# Patient Record
Sex: MALE | Race: WHITE | NOT HISPANIC OR LATINO | URBAN - METROPOLITAN AREA
[De-identification: names, ages, dates, MRNs, and addresses within clinical notes are randomized per-mention and may not be internally consistent; named-entity substitution may affect disease eponyms.]

---

## 2018-03-01 ENCOUNTER — EMERGENCY (EMERGENCY)
Facility: HOSPITAL | Age: 68
LOS: 1 days | Discharge: HOME | End: 2018-03-01
Attending: INTERNAL MEDICINE | Admitting: INTERNAL MEDICINE
Payer: MEDICARE

## 2018-03-01 ENCOUNTER — EMERGENCY (EMERGENCY)
Facility: HOSPITAL | Age: 68
LOS: 1 days | Discharge: HOME | End: 2018-03-01
Attending: EMERGENCY MEDICINE | Admitting: EMERGENCY MEDICINE
Payer: MEDICARE

## 2018-03-01 VITALS
DIASTOLIC BLOOD PRESSURE: 80 MMHG | TEMPERATURE: 98 F | RESPIRATION RATE: 16 BRPM | HEART RATE: 70 BPM | OXYGEN SATURATION: 100 % | SYSTOLIC BLOOD PRESSURE: 140 MMHG

## 2018-03-01 VITALS
WEIGHT: 149.91 LBS | DIASTOLIC BLOOD PRESSURE: 87 MMHG | TEMPERATURE: 99 F | HEART RATE: 72 BPM | RESPIRATION RATE: 16 BRPM | SYSTOLIC BLOOD PRESSURE: 170 MMHG | HEIGHT: 69 IN | OXYGEN SATURATION: 100 %

## 2018-03-01 VITALS
DIASTOLIC BLOOD PRESSURE: 96 MMHG | TEMPERATURE: 98 F | HEIGHT: 65 IN | WEIGHT: 149.91 LBS | HEART RATE: 86 BPM | OXYGEN SATURATION: 100 % | SYSTOLIC BLOOD PRESSURE: 168 MMHG | RESPIRATION RATE: 25 BRPM

## 2018-03-01 DIAGNOSIS — Z98.89 OTHER SPECIFIED POSTPROCEDURAL STATES: Chronic | ICD-10-CM

## 2018-03-01 DIAGNOSIS — Z90.49 ACQUIRED ABSENCE OF OTHER SPECIFIED PARTS OF DIGESTIVE TRACT: Chronic | ICD-10-CM

## 2018-03-01 LAB
APPEARANCE UR: CLEAR — SIGNIFICANT CHANGE UP
BACTERIA # UR AUTO: ABNORMAL
BILIRUB UR-MCNC: NEGATIVE — SIGNIFICANT CHANGE UP
COLOR SPEC: SIGNIFICANT CHANGE UP
DIFF PNL FLD: NEGATIVE — SIGNIFICANT CHANGE UP
EPI CELLS # UR: NEGATIVE — SIGNIFICANT CHANGE UP
GLUCOSE UR QL: NEGATIVE — SIGNIFICANT CHANGE UP
KETONES UR-MCNC: NEGATIVE — SIGNIFICANT CHANGE UP
LEUKOCYTE ESTERASE UR-ACNC: NEGATIVE — SIGNIFICANT CHANGE UP
NITRITE UR-MCNC: NEGATIVE — SIGNIFICANT CHANGE UP
PH UR: 7 — SIGNIFICANT CHANGE UP (ref 5–8)
PROT UR-MCNC: NEGATIVE — SIGNIFICANT CHANGE UP
RBC CASTS # UR COMP ASSIST: SIGNIFICANT CHANGE UP /HPF (ref 0–4)
SP GR SPEC: 1 — LOW (ref 1.01–1.02)
UROBILINOGEN FLD QL: NEGATIVE — SIGNIFICANT CHANGE UP
WBC UR QL: NEGATIVE — SIGNIFICANT CHANGE UP

## 2018-03-01 PROCEDURE — 99283 EMERGENCY DEPT VISIT LOW MDM: CPT | Mod: 25

## 2018-03-01 PROCEDURE — 87086 URINE CULTURE/COLONY COUNT: CPT

## 2018-03-01 PROCEDURE — 99284 EMERGENCY DEPT VISIT MOD MDM: CPT | Mod: 25

## 2018-03-01 PROCEDURE — 99283 EMERGENCY DEPT VISIT LOW MDM: CPT

## 2018-03-01 PROCEDURE — 81001 URINALYSIS AUTO W/SCOPE: CPT

## 2018-03-01 PROCEDURE — 51702 INSERT TEMP BLADDER CATH: CPT

## 2018-03-01 RX ORDER — PHENAZOPYRIDINE HCL 100 MG
200 TABLET ORAL ONCE
Qty: 0 | Refills: 0 | Status: COMPLETED | OUTPATIENT
Start: 2018-03-01 | End: 2018-03-01

## 2018-03-01 RX ORDER — PHENAZOPYRIDINE HCL 100 MG
1 TABLET ORAL
Qty: 15 | Refills: 0 | OUTPATIENT
Start: 2018-03-01 | End: 2018-03-05

## 2018-03-01 RX ADMIN — Medication 200 MILLIGRAM(S): at 22:40

## 2018-03-01 NOTE — ED PROVIDER NOTE - FAMILY HISTORY
Father  Still living? No  FH: lung cancer, Age at diagnosis: Age Unknown     Mother  Still living? No  FH: lung cancer, Age at diagnosis: Age Unknown

## 2018-03-01 NOTE — ED PROVIDER NOTE - SIGNIFICANT NEGATIVE FINDINGS
Instructed to call immediately if any new distortion, blurring, decreased vision or eye pain. no headache, no chest pain, no SOB, no palpitations, no abdominal pain,  no n/v.

## 2018-03-01 NOTE — ED PROVIDER NOTE - PROGRESS NOTE DETAILS
CONSULTED DR KWON, WHO ADVISED TO DC PT WITH CATHETER AND ADVISED PT TO FOLLOW UP MONDAY FOR CATH REMOVAL AND WEDNESDAY FOR APT WITH HIM. All questions answered and concerns addressed. pt verbalized understanding and agreement with plan and dx. pt advised to follow up with PMD. pt advised to return to ed for worsenng symptoms including fever, cp, sob. will dc.

## 2018-03-01 NOTE — ED PROVIDER NOTE - OBJECTIVE STATEMENT
pt is a 68yo male with no significant pmhx c/o urinary retention x today. pt reports he was dx with prostatitis 2 days ago by dr lynch placed on bactrim. pt reports this morning he voided without issue and then was unable to void. pt denies fever, cp, sob, abd pain, n/v/v.

## 2018-03-01 NOTE — ED ADULT NURSE REASSESSMENT NOTE - NS ED NURSE REASSESS COMMENT FT1
pt reavaluated no c/o pain voiced and bladder scan done with 11ml noted pt urinating with no pain slight tea colored noted no bleeding pt evaluated and medicated with prydium 200 mg po an d/c home to follow up

## 2018-03-01 NOTE — ED PROVIDER NOTE - OBJECTIVE STATEMENT
66 y/o w/m h/o prostatitis with urine retention this morning, tx with Huber- 16 and is on AB, PMD [place].  This evening he experienced bladder spasms and urine that flowed outside the lumen of the cathter. He is now asymptomatic

## 2018-03-01 NOTE — ED PROVIDER NOTE - ATTENDING CONTRIBUTION TO CARE
Pt is a 66 yo male hx bph. pt last week with dysuria. pt saw dr lynch and also with left flank pain had ct scan. pt d/x with prostatitis and placed on bactrim. pt today unable to urinate with suprapubic pain.  no f/c.  pt on exam with bladder distension.  no saddle anesthesia, or leg weakness. mercado placed with 700cc uo.  d/c with mercado,  cont abx, d/c mercado on monday at dr lynch office and fu wed.

## 2018-03-01 NOTE — ED ADULT NURSE NOTE - PMH
Benign prostatic hypertrophy (BPH) with nocturia    History of hyperlipidemia    HTN (hypertension)    LBBB (left bundle branch block)

## 2018-03-02 LAB
CULTURE RESULTS: NO GROWTH — SIGNIFICANT CHANGE UP
SPECIMEN SOURCE: SIGNIFICANT CHANGE UP

## 2018-10-24 NOTE — ED PROVIDER NOTE - CPE EDP NEURO NORM
Subjective     Chief Complaint   Patient presents with   • Seizures     pt started having sz last monday. 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 He is here today for hospital follow up for new onset seizure. He is accompanied by his girl friend who is an nurse on telemetry. Patient is ambulating unassisted. He has history of  bilateral frontal meningioma resections in 2015. Sounds like he took Keppra prophylactically for a while but discontinued secondary to GI effects. He was incarcerated in [place] at the time of cerebral resections. He was seen by Dr. DANEIL Crawley who discharged the patient on Depakote 500 mg TID but patient had called after discharge with complaints of side effects and has been taking BID. Per Dr. Crawley discharge plan, the patient is to be transitioned to Tegretol. Girl friend reports Woke with chest pain 10/21/18 and had staring and felt confusion lasted all day. Having intertmittent hiccups last 3-4 days throughout the day. Patient also has complaints of HA since discharge. He has been taking his girl friend fioricet.  Patient did have MRI brain, EEG, CT head, CTA head, and  labs and I have reviewed with patient/girl friend. Patient also has complaints of intermittent chest pain and is instructed to contact PCP or go to the ED. He has hx of tobacco abuse.       Patient has history of bilateral frontal meningioma resections in 2015. He reports in 2012 began to have dizziness and would fall down.  He reportedly took Keppra just after surgery and it made him sick.  He stopped it shortly afterwards.  He has had no issues until the several days prior to admission October 15, 2018 .  His girlfriend  assisted in the history.  She tells me that for the  several days prior to admission he was having  episodes of staring off in space and not being responsive.  He went to bed 10/14/18 at 11 PM.  At 3 AM this morning she woke up and found him to  have increased tone and rhythmic jerking of the left face, arm, and leg.  This lasted less than 15 seconds but occur approximately 6-10 times.  He had additional seizures in the ambulance on the way to the hospital and several in the ED as well.  These were eventually stopped after multiple rounds of Versed and a load of fosphenytoin.  He did have tongue biting with this but no urinary incontinence.  He has no previous history of seizures.      Seizures    This is a new problem. Episode onset: last reported 10/21/18. There were more than 10 seizures. The most recent episode lasted 30 to 120 seconds. Associated symptoms include confusion, headaches and chest pain. Pertinent negatives include no nausea, no vomiting and no diarrhea. Characteristics include rhythmic jerking (shaking of legs), loss of consciousness and bit tongue. Characteristics do not include bowel incontinence, bladder incontinence or apnea. stare off, not able to talk. confusion The episode was witnessed (girlfriend). The seizures continued in the ED. hx of bilateral frontal meningioma resection 2015        Current Outpatient Prescriptions   Medication Sig Dispense Refill   • amitriptyline (ELAVIL) 50 MG tablet Take 50 mg by mouth Every Night.     • divalproex (DEPAKOTE) 500 MG DR tablet Take 1 tablet by mouth 3 (Three) Times a Day. (Patient taking differently: Take 500 mg by mouth 2 (Two) Times a Day.) 90 tablet 1   • hydrochlorothiazide (HYDRODIURIL) 25 MG tablet Take 25 mg by mouth Daily.     • carBAMazepine (TEGRETOL) 200 MG tablet Take 1 tablet two times a day for 7 days then 1 tablet AM and 2 tablet PM for 7 days then 2 tablets BID thereafter 90 tablet 2     No current facility-administered medications for this visit.        Past Medical History:   Diagnosis Date   • Drug abuse (CMS/HCC)    • Hypertension        Past Surgical History:   Procedure Laterality Date   • TESTICLE TORSION REPAIR         family history is not on file.    Social  "History   Substance Use Topics   • Smoking status: Current Every Day Smoker     Packs/day: 1.00   • Smokeless tobacco: Not on file   • Alcohol use Yes      Comment: ocassionally       Review of Systems   Constitutional: Negative.  Negative for fatigue.   HENT: Negative for trouble swallowing and voice change.         Hiccups   Eyes: Negative.    Respiratory: Negative.  Negative for apnea and shortness of breath.    Cardiovascular: Positive for chest pain. Negative for leg swelling.   Gastrointestinal: Negative.  Negative for bowel incontinence, constipation, diarrhea, nausea and vomiting.   Endocrine: Negative.    Genitourinary: Negative.  Negative for bladder incontinence, dysuria and frequency.   Musculoskeletal: Negative for arthralgias and gait problem.   Skin: Negative.    Allergic/Immunologic: Negative.    Neurological: Positive for seizures, loss of consciousness and headaches. Negative for dizziness and weakness.   Hematological: Negative.    Psychiatric/Behavioral: Positive for confusion. Negative for agitation and hallucinations.   All other systems reviewed and are negative.      Objective     /90   Pulse 100   Ht 188 cm (74\")   Wt 108 kg (237 lb)   BMI 30.43 kg/m² , Body mass index is 30.43 kg/m².    Physical Exam   Constitutional: He is oriented to person, place, and time. Vital signs are normal. He appears well-developed and well-nourished. He is cooperative.   HENT:   Head: Normocephalic and atraumatic.   Right Ear: Hearing and external ear normal.   Left Ear: Hearing and external ear normal.   Nose: Nose normal.   Mouth/Throat: Oropharynx is clear and moist.   Eyes: Pupils are equal, round, and reactive to light. Conjunctivae, EOM and lids are normal. Right eye exhibits normal extraocular motion and no nystagmus. Left eye exhibits normal extraocular motion and no nystagmus. Right pupil is round and reactive. Left pupil is round and reactive. Pupils are equal.   Neck: Normal range of motion. " Neck supple. Carotid bruit is not present.   Cardiovascular: Normal rate, regular rhythm and normal heart sounds.    No murmur heard.  Pulses:       Dorsalis pedis pulses are 1+ on the right side, and 1+ on the left side.        Posterior tibial pulses are 1+ on the right side, and 1+ on the left side.   Pulmonary/Chest: Effort normal and breath sounds normal. He has no decreased breath sounds. He has no rhonchi.   Abdominal: Soft. Bowel sounds are normal.   Musculoskeletal: Normal range of motion.     Vascular Status -  His right foot exhibits no edema. His left foot exhibits no edema.  Neurological: He is alert and oriented to person, place, and time. He has normal strength and normal reflexes. He displays no tremor. No cranial nerve deficit or sensory deficit. He exhibits normal muscle tone. He displays a negative Romberg sign. Coordination and gait normal.   Reflex Scores:       Tricep reflexes are 2+ on the right side and 2+ on the left side.       Bicep reflexes are 2+ on the right side and 2+ on the left side.       Brachioradialis reflexes are 2+ on the right side and 2+ on the left side.       Patellar reflexes are 2+ on the right side and 2+ on the left side.       Achilles reflexes are 2+ on the right side and 2+ on the left side.  Awake, alert. No aphasia, no dysarthria  Completes simple and complex commands    CN II:  Visual fields full.  Pupils equally reactive to light  CN III, IV, VI:  Extraocular Muscles full with no signs of nystagmus  CN V:  Facial sensory is symmetric with no asymetries.  CN VII:  Facial motor symmetric  CN VIII:  Gross hearing intact bilaterally  CN IX:  Palate elevates symmetrically  CN X:  Palate elevates symmetrically  CN XI:  Shoulder shrug symmetric  CN XII:  Tongue is midline on protrusion    Full and symmetric strength bilateral upper and lower extremities.   Skin: Skin is warm and dry.   Psychiatric: He has a normal mood and affect. His speech is normal and behavior is  normal. Cognition and memory are normal.   Nursing note and vitals reviewed.      Results for orders placed or performed during the hospital encounter of 10/15/18   Comprehensive Metabolic Panel   Result Value Ref Range    Glucose 144 (H) 70 - 100 mg/dL    BUN 16 5 - 21 mg/dL    Creatinine 1.66 (H) 0.50 - 1.40 mg/dL    Sodium 137 135 - 145 mmol/L    Potassium 3.9 3.5 - 5.3 mmol/L    Chloride 98 98 - 110 mmol/L    CO2 21.0 (L) 24.0 - 31.0 mmol/L    Calcium 9.7 8.4 - 10.4 mg/dL    Total Protein 6.9 6.3 - 8.7 g/dL    Albumin 3.90 3.50 - 5.00 g/dL    ALT (SGPT) 53 0 - 54 U/L    AST (SGOT) 43 7 - 45 U/L    Alkaline Phosphatase 66 24 - 120 U/L    Total Bilirubin 0.4 0.1 - 1.0 mg/dL    eGFR  African Amer 55 (L) >60 mL/min/1.73    Globulin 3.0 gm/dL    A/G Ratio 1.3 1.1 - 2.5 g/dL    BUN/Creatinine Ratio 9.6 7.0 - 25.0    Anion Gap 18.0 (H) 4.0 - 13.0 mmol/L   Protime-INR   Result Value Ref Range    Protime 13.0 11.9 - 14.6 Seconds    INR 0.95 0.91 - 1.09   CBC Auto Differential   Result Value Ref Range    WBC 11.59 (H) 4.80 - 10.80 10*3/mm3    RBC 5.01 4.80 - 5.90 10*6/mm3    Hemoglobin 15.6 14.0 - 18.0 g/dL    Hematocrit 47.4 40.0 - 52.0 %    MCV 94.6 82.0 - 95.0 fL    MCH 31.1 28.0 - 32.0 pg    MCHC 32.9 (L) 33.0 - 36.0 g/dL    RDW 13.1 12.0 - 15.0 %    RDW-SD 45.3 40.0 - 54.0 fl    MPV 10.0 6.0 - 12.0 fL    Platelets 360 130 - 400 10*3/mm3   CK   Result Value Ref Range    Creatine Kinase 165 0 - 203 U/L   Urine Drug Screen - Urine, Clean Catch   Result Value Ref Range    Amphetamine Screen, Urine Negative Negative    Barbiturates Screen, Urine Negative Negative    Benzodiazepine Screen, Urine Positive (A) Negative    Cocaine Screen, Urine Negative Negative    Methadone Screen, Urine Negative Negative    Opiate Screen Positive (A) Negative    Phencyclidine (PCP), Urine Negative Negative    THC, Screen, Urine Negative Negative   TSH   Result Value Ref Range    TSH 3.230 0.470 - 4.680 mIU/mL   Myoglobin, Serum   Result  Value Ref Range    Myoglobin 447.5 (H) 0.0 - 110.0 ng/mL   Magnesium   Result Value Ref Range    Magnesium 2.2 1.4 - 2.2 mg/dL   T4, Free   Result Value Ref Range    Free T4 0.95 0.78 - 2.19 ng/dL   Troponin   Result Value Ref Range    Troponin I <0.012 0.000 - 0.034 ng/mL   Basic Metabolic Panel   Result Value Ref Range    Glucose 92 70 - 100 mg/dL    BUN 12 5 - 21 mg/dL    Creatinine 1.30 0.50 - 1.40 mg/dL    Sodium 141 135 - 145 mmol/L    Potassium 4.2 3.5 - 5.3 mmol/L    Chloride 102 98 - 110 mmol/L    CO2 31.0 24.0 - 31.0 mmol/L    Calcium 8.9 8.4 - 10.4 mg/dL    eGFR  African Amer 73 >60 mL/min/1.73    BUN/Creatinine Ratio 9.2 7.0 - 25.0    Anion Gap 8.0 4.0 - 13.0 mmol/L   CBC Auto Differential   Result Value Ref Range    WBC 6.29 4.80 - 10.80 10*3/mm3    RBC 4.59 (L) 4.80 - 5.90 10*6/mm3    Hemoglobin 14.2 14.0 - 18.0 g/dL    Hematocrit 41.1 40.0 - 52.0 %    MCV 89.5 82.0 - 95.0 fL    MCH 30.9 28.0 - 32.0 pg    MCHC 34.5 33.0 - 36.0 g/dL    RDW 13.1 12.0 - 15.0 %    RDW-SD 42.9 40.0 - 54.0 fl    MPV 9.6 6.0 - 12.0 fL    Platelets 258 130 - 400 10*3/mm3    Neutrophil % 53.8 39.0 - 78.0 %    Lymphocyte % 37.5 15.0 - 45.0 %    Monocyte % 7.2 4.0 - 12.0 %    Eosinophil % 1.0 0.0 - 4.0 %    Basophil % 0.3 0.0 - 2.0 %    Immature Grans % 0.2 0.0 - 5.0 %    Neutrophils, Absolute 3.39 1.87 - 8.40 10*3/mm3    Lymphocytes, Absolute 2.36 0.72 - 4.86 10*3/mm3    Monocytes, Absolute 0.45 0.19 - 1.30 10*3/mm3    Eosinophils, Absolute 0.06 0.00 - 0.70 10*3/mm3    Basophils, Absolute 0.02 0.00 - 0.20 10*3/mm3    Immature Grans, Absolute 0.01 0.00 - 0.03 10*3/mm3    nRBC 0.0 0.0 - 0.0 /100 WBC   POC Glucose Once   Result Value Ref Range    Glucose 164 (H) 70 - 130 mg/dL   Light Blue Top   Result Value Ref Range    Extra Tube hold for add-on    Green Top (Gel)   Result Value Ref Range    Extra Tube Hold for add-ons.    Lavender Top   Result Value Ref Range    Extra Tube hold for add-on    Red Top   Result Value Ref Range     Extra Tube Hold for add-ons.    Manual Differential   Result Value Ref Range    Neutrophil % 39.6 39.0 - 78.0 %    Lymphocyte % 29.2 15.0 - 45.0 %    Monocyte % 6.3 4.0 - 12.0 %    Eosinophil % 4.2 (H) 0.0 - 4.0 %    Basophil % 2.1 (H) 0.0 - 2.0 %    Metamyelocyte % 1.0 (H) 0.0 - 0.0 %    Atypical Lymphocyte % 17.7 (H) 0.0 - 5.0 %    Neutrophils Absolute 4.59 1.87 - 8.40 10*3/mm3    Lymphocytes Absolute 3.38 0.72 - 4.86 10*3/mm3    Monocytes Absolute 0.73 0.19 - 1.30 10*3/mm3    Eosinophils Absolute 0.49 0.00 - 0.70 10*3/mm3    Basophils Absolute 0.24 (H) 0.00 - 0.20 10*3/mm3    Poikilocytes Slight/1+ None Seen    Smudge Cells Slight/1+ None Seen    Clumped Platelets Present None Seen      MRI BRAIN WITH AND WITH OUT: IMPRESSION:  1.  No acute abnormalities.  2.  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  3.  Severely and cephalization gliosis in the bifrontal lobes.    EEG: Findings: This is an abnormal EEG showing unilateral slowing over the right hemisphere. This could be secondary to an underlying CNS structural lesion. This could also represent a postictal state from a partial seizure arising in the right hemisphere. Clinical correlation is recommended.    CT HEAD: IMPRESSION:  1. No CT evidence of an acute intracranial process.       2. Changes from frontal craniotomy with bilateral inferior frontal lobe  encephalomalacia change.  3. Paranasal sinus disease      CTA HEAD:IMPRESSION:  1. Negative CT angiography of the head.      ASSESSMENT/PLAN    Diagnoses and all orders for this visit:    Seizure (CMS/[facility])    Therapeutic drug monitoring  -     CBC & Differential; Future  -     Comprehensive Metabolic Panel; Future  -     Valproic Acid Level, Total; Future    Other orders  -     carBAMazepine (TEGRETOL) 200 MG tablet; Take 1 tablet two times a day for 7 days then 1 tablet AM and 2 tablet PM for 7 days then 2 tablets BID thereafter      MEDICAL DECISION  MAKIN. Start Tegretol 200 mg BID for 7 days then titrate ove the next 2 weeks to 2 tablet BID. Counseled on side effects including rash, SJS, hyponatremia  2. Once taking tegretol 200 mg 2 tablets BID begin to wean off Depakote  3. No driving for 90 days from last seizure  4. Counseled on safety measures for seizure related to occupation. No climbing ladders, using sharp tools, shower only.  5. Would like to see if HAs improve once tegretol started and evaluate at follow up  6. Patient to f/u with  PCP for chest pain or go to ED  7. Check labs today and then repeat CMP in at next visit, 10 days.   8. Counseled on risk factors that lower seizure threshold  9. Seizure precautions were discussed to include no tub baths, no swimming, avoiding lack of sleep, and avoiding known triggers. Education given of things that may contribute to a seizure to include, but not limited to: stressful situations, fever, fatigue, lack of sleep, low blood sugar, hyperventilation, flashing lights, and caffeine. Instructions given to take seizure medications as prescribed. Education given to family member on what to do during a seizure and care following the seizure. Education given to contact this office prior to stopping or changing any medications.  10. I advised Chirag of the risks of continuing to use tobacco, and I provided him with tobacco cessation educational materials in the After Visit Summary.     During this visit, I spent 3-10 minutes counseling the patient regarding tobacco cessation.     allergies and all known medications/prescriptions have been reviewed using resources available on this encounter.    Return in about 10 days (around 11/3/2018).        Viviane Glasgow, ROBERT         normal...

## 2023-03-24 NOTE — ED ADULT NURSE NOTE - CAS EDN DISCHARGE ASSESSMENT
Stage 2: Additional Anesthesia Type: 1% lidocaine with epinephrine Alert and oriented to person, place and time

## 2023-04-28 ENCOUNTER — APPOINTMENT (OUTPATIENT)
Dept: ORTHOPEDIC SURGERY | Facility: CLINIC | Age: 73
End: 2023-04-28
Payer: MEDICARE

## 2023-04-28 VITALS — BODY MASS INDEX: 24.99 KG/M2 | HEIGHT: 65 IN | WEIGHT: 150 LBS

## 2023-04-28 DIAGNOSIS — E78.00 PURE HYPERCHOLESTEROLEMIA, UNSPECIFIED: ICD-10-CM

## 2023-04-28 DIAGNOSIS — I10 ESSENTIAL (PRIMARY) HYPERTENSION: ICD-10-CM

## 2023-04-28 DIAGNOSIS — S29.011A STRAIN OF MUSCLE AND TENDON OF FRONT WALL OF THORAX, INITIAL ENCOUNTER: ICD-10-CM

## 2023-04-28 PROCEDURE — 99203 OFFICE O/P NEW LOW 30 MIN: CPT

## 2023-04-28 PROCEDURE — 73030 X-RAY EXAM OF SHOULDER: CPT | Mod: RT

## 2023-04-28 NOTE — PHYSICAL EXAM
[de-identified] : Patient comes the office today with approximately a weeks worth of pain on the right side of his thoracic region.  Denies any specific injury to the area but is likely related to possibly carrying a suitcase on his return home from [place].  He had previous shoulder surgery on the same side but his shoulder seems okay.  Emanation of the right shoulder reveals full range of motion with no significant swelling or tenderness in the AC joint biceps tendon or deltoid bursa.  Strength in the right shoulder is grossly normal.  Examination of the right thoracic region reveals some tenderness in the intercostal muscles in the midportion of the thoracic region.  Does not extend to the shoulder area and is locally tender in that 1 particular muscle

## 2023-04-28 NOTE — HISTORY OF PRESENT ILLNESS
[0] : 0 [Tightness] : tightness [Intermittent] : intermittent [Meds] : meds [] : yes [FreeTextEntry1] : Rt. Gruber [FreeTextEntry5] : 73 y/o M presents for f/u eval. of Rt. shldr. Pt reports of tight shldr pain (8/10) around 3 weeks ago with no associated trauma and limited ROM. He tried Naproxen with some relief. Pain radiated into his neck and down towards his midback, believes it might be muscular.  He notes pain has dissipated since then. [FreeTextEntry9] : Motrin & Naproxen prn [de-identified] : reaching overhead

## 2023-04-28 NOTE — ASSESSMENT
[FreeTextEntry1] : Patient will limited strenuous activities over the next week or 2 and take Naprosyn 500 twice a day as necessary.  He will return in 2 weeks when his wife returns if necessary.

## 2023-04-28 NOTE — DATA REVIEWED
[FreeTextEntry1] : 2 x-rays of the right shoulder are performed today and revealed good position in the AC joint with no signs of any spur or significant degenerative changes.

## 2024-07-16 NOTE — ED ADULT NURSE NOTE - HEART RATE (BEATS/MIN)
Spoke with wife Breana and advised her to have patient go for stat labs so we can see where his counts are specifically WBC, ANC and plts.  Advised to have patient double mask.  Labs are ordered.  Advised patient to f/u with nephrologist regarding BLE Edema and how to proceed.  Will f/u with wife once we have results from his labs.  Infusion notified to cancel upcoming appointment on 07/18/24 due to covid   86